# Patient Record
Sex: MALE | Race: BLACK OR AFRICAN AMERICAN | NOT HISPANIC OR LATINO | URBAN - METROPOLITAN AREA
[De-identification: names, ages, dates, MRNs, and addresses within clinical notes are randomized per-mention and may not be internally consistent; named-entity substitution may affect disease eponyms.]

---

## 2020-08-11 ENCOUNTER — HISTORICAL (OUTPATIENT)
Dept: URGENT CARE | Facility: CLINIC | Age: 20
End: 2020-08-11

## 2022-04-07 ENCOUNTER — HISTORICAL (OUTPATIENT)
Dept: ADMINISTRATIVE | Facility: HOSPITAL | Age: 22
End: 2022-04-07

## 2022-04-23 VITALS
OXYGEN SATURATION: 99 % | WEIGHT: 233.69 LBS | SYSTOLIC BLOOD PRESSURE: 148 MMHG | HEIGHT: 71 IN | BODY MASS INDEX: 32.72 KG/M2 | DIASTOLIC BLOOD PRESSURE: 90 MMHG

## 2022-08-16 PROBLEM — E66.9 OBESITY: Status: ACTIVE | Noted: 2022-08-16

## 2022-08-17 NOTE — PROGRESS NOTES
Nicci Serna MD   [address] [place] [address]     PATIENT NAME: Jake Navarrete  : 2000  DATE: 22  MRN: [id number]      Billing Provider: Nicci Serna MD  Level of Service:   Patient PCP Information     Provider PCP Type    Nicci Serna MD General          Reason for Visit / Chief Complaint: Abdominal Pain (Right eye redness post dust flying in it)       Update PCP  Update Chief Complaint         History of Present Illness / Problem Focused Workflow     Jake Navarrete presents to the clinic with Abdominal Pain (Right eye redness post dust flying in it)     22 year old here to follow up from ER for abdominal pain, he was given fluids and testing and and IV antibiotic. He was not discharged on medication. He is trying to eat healthier.   He has been working at [employer] and dust flew in his eye, Saturday. He has a red eye since.       Review of Systems     Review of Systems   Constitutional: Negative for diaphoresis and fever.   Eyes: Positive for redness.        Did not use eye wash at [employer] after dust blew in his eye, it was time to get off and he was in a hurry   Respiratory: Negative.    Cardiovascular: Negative.    Gastrointestinal: Positive for abdominal pain.        Had not had any stomach issues before this ER visit   Genitourinary: Negative for dysuria and hematuria.       Medical / Social / Family History     Past Medical History:   Diagnosis Date    Asthma        History reviewed. No pertinent surgical history.    Social History  Mr. Navarrete      reports that he has never smoked. He has never used smokeless tobacco. He reports that he does not drink alcohol and does not use drugs.    Family History  Mr.'s Navarrete   family history includes Arthritis in his maternal grandmother; Heart attack in his maternal grandfather.    Medications and Allergies     Medications  No outpatient medications have been marked as taking for the 22 encounter (Office Visit) with Nicci Serna  MD.       Allergies  Review of patient's allergies indicates:  No Known Allergies    Physical Examination     Vitals:    08/18/22 1121   BP: 124/86   Pulse: 74   Resp: 16   Temp: 97.4 °F (36.3 °C)     Physical Exam  Vitals reviewed.   Constitutional:       Appearance: He is obese. He is not ill-appearing.   HENT:      Head: Normocephalic.      Mouth/Throat:      Mouth: Mucous membranes are dry.   Eyes:      Extraocular Movements: Extraocular movements intact.      Pupils: Pupils are equal, round, and reactive to light.      Comments: Both eyes injected in conjunctival sac, no debris visible.    Cardiovascular:      Rate and Rhythm: Normal rate and regular rhythm.      Pulses: Normal pulses.   Pulmonary:      Effort: Pulmonary effort is normal.      Breath sounds: Normal breath sounds. No wheezing.   Abdominal:      General: Bowel sounds are normal.      Palpations: Abdomen is soft. There is no mass.      Tenderness: There is abdominal tenderness. There is no right CVA tenderness, guarding or rebound.      Comments: R mid abdomen tender anteriorly   Musculoskeletal:         General: Normal range of motion.      Cervical back: Normal range of motion.   Skin:     General: Skin is warm and dry.   Neurological:      Mental Status: He is alert.           Assessment and Plan (including Health Maintenance)      Problem List  Smart Sets  Document Outside HM   :    Plan:   UA today since near flank, will start Flagyl since it is not clearly the kidney on exam, I would consider GI if he doesn't improve since [facility].  Conjunctivitis, will Rx antibiotic since not resolving, discussed need to rinse eyes when debris is sent into them.       Health Maintenance Due   Topic Date Due    Hepatitis C Screening  Never done    Lipid Panel  Never done    COVID-19 Vaccine (1) Never done    HPV Vaccines (1 - Male 2-dose series) Never done    HIV Screening  Never done    TETANUS VACCINE  Never  done       Problem List Items Addressed This Visit    None     Visit Diagnoses     Pain radiating to lower abdomen    -  Primary    Relevant Orders    Urinalysis, Reflex to Urine Culture Urine, Clean Catch    Abnormal stool test        Serous conjunctivitis of left eye              Health Maintenance Topics with due status: Not Due       Topic Last Completion Date    Influenza Vaccine Not Due       No future appointments. 6mo for Wellness        Signature:  Nicci Serna MD  [facility]  [address] [place] [address]    Date of encounter: 8/18/22

## 2022-08-18 ENCOUNTER — OFFICE VISIT (OUTPATIENT)
Dept: PRIMARY CARE CLINIC | Facility: CLINIC | Age: 22
End: 2022-08-18
Payer: COMMERCIAL

## 2022-08-18 VITALS
RESPIRATION RATE: 16 BRPM | OXYGEN SATURATION: 96 % | DIASTOLIC BLOOD PRESSURE: 86 MMHG | BODY MASS INDEX: 35 KG/M2 | HEART RATE: 74 BPM | WEIGHT: 250 LBS | HEIGHT: 71 IN | TEMPERATURE: 97 F | SYSTOLIC BLOOD PRESSURE: 124 MMHG

## 2022-08-18 DIAGNOSIS — R19.5 ABNORMAL STOOL TEST: ICD-10-CM

## 2022-08-18 DIAGNOSIS — R10.30 PAIN RADIATING TO LOWER ABDOMEN: Primary | ICD-10-CM

## 2022-08-18 DIAGNOSIS — H10.232 SEROUS CONJUNCTIVITIS OF LEFT EYE: ICD-10-CM

## 2022-08-18 LAB
APPEARANCE UR: CLEAR
BACTERIA #/AREA URNS AUTO: NORMAL /HPF
BILIRUB UR QL STRIP.AUTO: NEGATIVE MG/DL
COLOR UR AUTO: YELLOW
GLUCOSE UR QL STRIP.AUTO: NEGATIVE MG/DL
KETONES UR QL STRIP.AUTO: NEGATIVE MG/DL
LEUKOCYTE ESTERASE UR QL STRIP.AUTO: ABNORMAL UNIT/L
NITRITE UR QL STRIP.AUTO: NEGATIVE
PH UR STRIP.AUTO: 6.5 [PH]
PROT UR QL STRIP.AUTO: NEGATIVE MG/DL
RBC #/AREA URNS AUTO: NORMAL /HPF
RBC UR QL AUTO: NEGATIVE UNIT/L
SP GR UR STRIP.AUTO: >=1.03
SQUAMOUS #/AREA URNS AUTO: NORMAL /HPF
UROBILINOGEN UR STRIP-ACNC: 0.2 MG/DL
WBC #/AREA URNS AUTO: NORMAL /HPF

## 2022-08-18 PROCEDURE — 99203 OFFICE O/P NEW LOW 30 MIN: CPT | Mod: ,,, | Performed by: INTERNAL MEDICINE

## 2022-08-18 PROCEDURE — 99203 PR OFFICE/OUTPT VISIT, NEW, LEVL III, 30-44 MIN: ICD-10-PCS | Mod: ,,, | Performed by: INTERNAL MEDICINE

## 2022-08-18 PROCEDURE — 81001 URINALYSIS AUTO W/SCOPE: CPT | Performed by: INTERNAL MEDICINE

## 2022-08-18 RX ORDER — METRONIDAZOLE 250 MG/1
500 TABLET ORAL EVERY 6 HOURS
Qty: 56 TABLET | Refills: 0 | Status: SHIPPED | OUTPATIENT
Start: 2022-08-18 | End: 2022-08-25

## 2022-08-18 RX ORDER — CIPROFLOXACIN AND DEXAMETHASONE 3; 1 MG/ML; MG/ML
4 SUSPENSION/ DROPS AURICULAR (OTIC) 2 TIMES DAILY
Qty: 7.5 ML | Refills: 0 | Status: SHIPPED | OUTPATIENT
Start: 2022-08-18

## 2022-08-22 ENCOUNTER — TELEPHONE (OUTPATIENT)
Dept: PRIMARY CARE CLINIC | Facility: CLINIC | Age: 22
End: 2022-08-22
Payer: COMMERCIAL

## 2022-08-22 NOTE — TELEPHONE ENCOUNTER
----- Message from Nicci Serna MD sent at 8/18/2022  6:10 PM CDT -----  It does not look like kidney is the source of pain, the trace leukocytes isn't showing any significant cells on microscopic.

## 2022-08-23 ENCOUNTER — TELEPHONE (OUTPATIENT)
Dept: PRIMARY CARE CLINIC | Facility: CLINIC | Age: 22
End: 2022-08-23
Payer: COMMERCIAL

## 2022-08-24 NOTE — TELEPHONE ENCOUNTER
Patient states he has diarrhea with the antibiotic he was told to stop does he need replacement,no longer c/o pain.

## 2022-08-24 NOTE — TELEPHONE ENCOUNTER
I wish it was longer but the other two options are usually worse for diarrhea so let's wait and watch.

## 2022-08-29 ENCOUNTER — TELEPHONE (OUTPATIENT)
Dept: PRIMARY CARE CLINIC | Facility: CLINIC | Age: 22
End: 2022-08-29
Payer: COMMERCIAL

## 2022-08-29 NOTE — TELEPHONE ENCOUNTER
Spoke to patient c/o sinus congestion and coughing states he is taking otc sinus medication but coughing up yellowish mucus.

## 2023-06-13 ENCOUNTER — PATIENT MESSAGE (OUTPATIENT)
Dept: RESEARCH | Facility: HOSPITAL | Age: 23
End: 2023-06-13
Payer: COMMERCIAL